# Patient Record
Sex: MALE | URBAN - NONMETROPOLITAN AREA
[De-identification: names, ages, dates, MRNs, and addresses within clinical notes are randomized per-mention and may not be internally consistent; named-entity substitution may affect disease eponyms.]

---

## 2021-12-03 ENCOUNTER — OFFICE VISIT (OUTPATIENT)
Dept: URBAN - NONMETROPOLITAN AREA CLINIC 8 | Facility: CLINIC | Age: 66
End: 2021-12-03
Payer: MEDICARE

## 2021-12-03 DIAGNOSIS — H25.13 AGE-RELATED NUCLEAR CATARACT, BILATERAL: Primary | ICD-10-CM

## 2021-12-03 DIAGNOSIS — H43.813 VITREOUS DEGENERATION, BILATERAL: ICD-10-CM

## 2021-12-03 PROCEDURE — 92004 COMPRE OPH EXAM NEW PT 1/>: CPT | Performed by: OPTOMETRIST

## 2021-12-03 ASSESSMENT — INTRAOCULAR PRESSURE
OD: 16
OS: 16

## 2021-12-03 ASSESSMENT — KERATOMETRY
OS: 44.38
OD: 44.38

## 2021-12-03 NOTE — IMPRESSION/PLAN
Impression: Age-related nuclear cataract, bilateral: H25.13. Plan: Cataracts account for the patient's complaints. No treatment currently recommended. The patient will monitor vision changes and contact us with any decrease in vision. Pt ed.

## 2022-03-07 ENCOUNTER — OFFICE VISIT (OUTPATIENT)
Dept: URBAN - NONMETROPOLITAN AREA CLINIC 8 | Facility: CLINIC | Age: 67
End: 2022-03-07
Payer: MEDICARE

## 2022-03-07 DIAGNOSIS — H04.123 DRY EYE SYNDROME OF BILATERAL LACRIMAL GLANDS: ICD-10-CM

## 2022-03-07 PROCEDURE — 99213 OFFICE O/P EST LOW 20 MIN: CPT | Performed by: OPTOMETRIST

## 2022-03-07 ASSESSMENT — INTRAOCULAR PRESSURE
OS: 16
OD: 16

## 2023-01-04 ENCOUNTER — OFFICE VISIT (OUTPATIENT)
Dept: URBAN - NONMETROPOLITAN AREA CLINIC 8 | Facility: CLINIC | Age: 68
End: 2023-01-04
Payer: MEDICARE

## 2023-01-04 DIAGNOSIS — H04.123 DRY EYE SYNDROME OF BILATERAL LACRIMAL GLANDS: ICD-10-CM

## 2023-01-04 DIAGNOSIS — H25.813 COMBINED FORMS OF AGE-RELATED CATARACT, BILATERAL: Primary | ICD-10-CM

## 2023-01-04 DIAGNOSIS — H43.813 VITREOUS DEGENERATION, BILATERAL: ICD-10-CM

## 2023-01-04 PROCEDURE — 92014 COMPRE OPH EXAM EST PT 1/>: CPT | Performed by: OPTOMETRIST

## 2023-01-04 ASSESSMENT — INTRAOCULAR PRESSURE
OS: 16
OD: 16

## 2023-01-04 ASSESSMENT — VISUAL ACUITY
OD: 20/20
OS: 20/70

## 2023-01-04 ASSESSMENT — KERATOMETRY
OD: 44.38
OS: 44.88

## 2023-01-04 NOTE — IMPRESSION/PLAN
Impression: Combined forms of age-related cataract, bilateral: H25.813. Plan: Cataracts account for the patient's complaints. Discussed all risks, benefits, procedures and recovery. Patient understands changing glasses will not improve vision. Patient desires to have surgery. Will schedule cataract surgery consult with Dr. Taqueria Lucas.

## 2023-01-09 ENCOUNTER — OFFICE VISIT (OUTPATIENT)
Dept: URBAN - METROPOLITAN AREA CLINIC 63 | Facility: CLINIC | Age: 68
End: 2023-01-09
Payer: MEDICARE

## 2023-01-09 DIAGNOSIS — H25.813 COMBINED FORMS OF AGE-RELATED CATARACT, BILATERAL: Primary | ICD-10-CM

## 2023-01-09 DIAGNOSIS — H43.813 VITREOUS DEGENERATION, BILATERAL: ICD-10-CM

## 2023-01-09 PROCEDURE — 99204 OFFICE O/P NEW MOD 45 MIN: CPT | Performed by: OPHTHALMOLOGY

## 2023-01-09 ASSESSMENT — VISUAL ACUITY
OD: 20/20
OS: 20/70

## 2023-01-09 ASSESSMENT — INTRAOCULAR PRESSURE
OS: 15
OD: 16

## 2023-01-09 NOTE — IMPRESSION/PLAN
Impression: Combined forms of age-related cataract, bilateral: H25.813. Plan: Cataract accounts for pt complaints. Pt desires sx. Schedule CE/IOL both eyes, left then right. Risk/Benefits/Alternatives discussed with patient. Rec. mono-focal/Toric/Vivity. Consider ORA/LRI. RL2. Target distance. Order a-scan. Will likely need cataract surgery in the other eye due to anisometropia. Discussed loss of all near vision with distance target. Patient to decide distance vs near target. Patient is a candidate for Delgado Denise. Discussed R/B/A. Recommend Ofloxacin or Tobramycin QID for 1 week postop. Intra-cameral Moxifloxacin to decrease the risk of infection. May elect to use combination drops or generics per patient preference.

## 2023-03-22 ENCOUNTER — PROCEDURE (OUTPATIENT)
Dept: URBAN - METROPOLITAN AREA SURGERY 37 | Facility: SURGERY | Age: 68
End: 2023-03-22
Payer: MEDICARE

## 2023-03-22 DIAGNOSIS — H25.813 COMBINED FORMS OF AGE-RELATED CATARACT, BILATERAL: ICD-10-CM

## 2023-03-22 DIAGNOSIS — H52.223 REGULAR ASTIGMATISM, BILATERAL: Primary | ICD-10-CM

## 2023-03-22 PROCEDURE — PR1CP PR1CP: CUSTOM | Performed by: OPHTHALMOLOGY

## 2023-03-22 PROCEDURE — 66984 XCAPSL CTRC RMVL W/O ECP: CPT | Performed by: OPHTHALMOLOGY

## 2023-03-23 ENCOUNTER — POST-OPERATIVE VISIT (OUTPATIENT)
Dept: URBAN - NONMETROPOLITAN AREA CLINIC 8 | Facility: CLINIC | Age: 68
End: 2023-03-23
Payer: MEDICARE

## 2023-03-23 DIAGNOSIS — Z48.810 ENCOUNTER FOR SURGICAL AFTERCARE FOLLOWING SURGERY ON A SENSE ORGAN: Primary | ICD-10-CM

## 2023-03-23 PROCEDURE — 99024 POSTOP FOLLOW-UP VISIT: CPT | Performed by: OPHTHALMOLOGY

## 2023-03-23 ASSESSMENT — INTRAOCULAR PRESSURE
OS: 35
OS: 12

## 2023-03-23 NOTE — IMPRESSION/PLAN
Impression: S/P Cataract Extraction by phacoemulsification with IOL placement; ORA; LRI (Limbal Relaxing Incision) OS - 1 Day. Encounter for surgical aftercare following surgery on a sense organ  Z48.810. Plan: Doing well. Using Ofloxacin QID OS x 1 week. Consider Eygeanroce OD.

## 2023-03-31 ENCOUNTER — POST-OPERATIVE VISIT (OUTPATIENT)
Dept: URBAN - NONMETROPOLITAN AREA CLINIC 8 | Facility: CLINIC | Age: 68
End: 2023-03-31
Payer: MEDICARE

## 2023-03-31 DIAGNOSIS — Z48.810 ENCOUNTER FOR SURGICAL AFTERCARE FOLLOWING SURGERY ON A SENSE ORGAN: Primary | ICD-10-CM

## 2023-03-31 PROCEDURE — 99024 POSTOP FOLLOW-UP VISIT: CPT | Performed by: OPTOMETRIST

## 2023-03-31 ASSESSMENT — VISUAL ACUITY
OS: 20/20
OD: 20/20

## 2023-03-31 ASSESSMENT — INTRAOCULAR PRESSURE
OS: 13
OD: 16

## 2023-03-31 NOTE — IMPRESSION/PLAN
Impression: S/P Cataract Extraction by phacoemulsification with IOL placement; ORA; LRI (Limbal Relaxing Incision) OS - 9 Days. Encounter for surgical aftercare following surgery on a sense organ  Z48.810. Plan: RTC for 2nd eye cataract Sx. --Advised patient to use artificial tears for comfort.

## 2023-04-07 ENCOUNTER — POST-OPERATIVE VISIT (OUTPATIENT)
Dept: URBAN - NONMETROPOLITAN AREA CLINIC 8 | Facility: CLINIC | Age: 68
End: 2023-04-07
Payer: MEDICARE

## 2023-04-07 DIAGNOSIS — Z96.1 PRESENCE OF INTRAOCULAR LENS: Primary | ICD-10-CM

## 2023-04-07 PROCEDURE — 99024 POSTOP FOLLOW-UP VISIT: CPT | Performed by: OPTOMETRIST

## 2023-04-07 ASSESSMENT — INTRAOCULAR PRESSURE
OD: 34
OD: 36
OD: 10

## 2023-04-07 NOTE — IMPRESSION/PLAN
Impression: S/P Cataract Extraction by phacoemulsification with IOL placement; ORA; LRI (Limbal Relaxing Incision) OD - 1 Day. Presence of intraocular lens  Z96.1.  Plan: 1 wk, PO2

## 2023-04-14 ENCOUNTER — POST-OPERATIVE VISIT (OUTPATIENT)
Dept: URBAN - NONMETROPOLITAN AREA CLINIC 8 | Facility: CLINIC | Age: 68
End: 2023-04-14
Payer: MEDICARE

## 2023-04-14 DIAGNOSIS — Z96.1 PRESENCE OF INTRAOCULAR LENS: Primary | ICD-10-CM

## 2023-04-14 PROCEDURE — 99024 POSTOP FOLLOW-UP VISIT: CPT | Performed by: OPTOMETRIST

## 2023-04-14 ASSESSMENT — INTRAOCULAR PRESSURE
OD: 10
OS: 10

## 2023-04-14 NOTE — IMPRESSION/PLAN
Impression: S/P Cataract Extraction by phacoemulsification with IOL placement; ORA; LRI (Limbal Relaxing Incision) OD - 8 Days. Presence of intraocular lens  Z96.1. Plan: RTC in 3 weeks for PO3. Continue AFT's PRN OU.

## 2023-05-05 ENCOUNTER — POST-OPERATIVE VISIT (OUTPATIENT)
Dept: URBAN - NONMETROPOLITAN AREA CLINIC 8 | Facility: CLINIC | Age: 68
End: 2023-05-05
Payer: MEDICARE

## 2023-05-05 DIAGNOSIS — Z96.1 PRESENCE OF INTRAOCULAR LENS: Primary | ICD-10-CM

## 2023-05-05 PROCEDURE — 99024 POSTOP FOLLOW-UP VISIT: CPT | Performed by: OPTOMETRIST

## 2023-05-05 ASSESSMENT — INTRAOCULAR PRESSURE
OS: 13
OD: 13

## 2023-05-05 NOTE — IMPRESSION/PLAN
Impression: S/P Cataract Extraction by phacoemulsification with IOL placement; ORA; LRI (Limbal Relaxing Incision) OD - 29 Days. Presence of intraocular lens  Z96.1. Plan: RTC in 3 months for DE. Continue AFT's OU.

## 2023-08-04 ENCOUNTER — OFFICE VISIT (OUTPATIENT)
Dept: URBAN - NONMETROPOLITAN AREA CLINIC 8 | Facility: CLINIC | Age: 68
End: 2023-08-04
Payer: MEDICARE

## 2023-08-04 DIAGNOSIS — H43.813 VITREOUS DEGENERATION, BILATERAL: Primary | ICD-10-CM

## 2023-08-04 DIAGNOSIS — H04.123 DRY EYE SYNDROME OF BILATERAL LACRIMAL GLANDS: ICD-10-CM

## 2023-08-04 DIAGNOSIS — Z96.1 PRESENCE OF INTRAOCULAR LENS: ICD-10-CM

## 2023-08-04 PROCEDURE — 92014 COMPRE OPH EXAM EST PT 1/>: CPT | Performed by: OPTOMETRIST

## 2023-08-04 ASSESSMENT — INTRAOCULAR PRESSURE
OD: 12
OS: 12

## 2024-02-22 ENCOUNTER — OFFICE VISIT (OUTPATIENT)
Dept: URBAN - NONMETROPOLITAN AREA CLINIC 8 | Facility: CLINIC | Age: 69
End: 2024-02-22
Payer: MEDICARE

## 2024-02-22 DIAGNOSIS — H00.12 CHALAZION RIGHT LOWER EYELID: Primary | ICD-10-CM

## 2024-02-22 DIAGNOSIS — H01.8 OTHER SPECIFIED INFLAMMATIONS OF EYELID: ICD-10-CM

## 2024-02-22 PROCEDURE — 99213 OFFICE O/P EST LOW 20 MIN: CPT | Performed by: OPHTHALMOLOGY

## 2024-02-22 RX ORDER — BACITRACIN ZINC AND POLYMYXIN B SULFATE 500; 10000 [USP'U]/G; [USP'U]/G
OINTMENT OPHTHALMIC
Qty: 3.5 | Refills: 3 | Status: ACTIVE
Start: 2024-02-22

## 2024-02-22 RX ORDER — CEPHALEXIN 500 MG/1
500 MG CAPSULE ORAL
Qty: 20 | Refills: 0 | Status: ACTIVE
Start: 2024-02-22

## 2024-02-22 ASSESSMENT — INTRAOCULAR PRESSURE
OD: 12
OS: 11

## 2024-11-22 ENCOUNTER — OFFICE VISIT (OUTPATIENT)
Dept: URBAN - NONMETROPOLITAN AREA CLINIC 8 | Facility: CLINIC | Age: 69
End: 2024-11-22
Payer: MEDICARE

## 2024-11-22 DIAGNOSIS — H43.813 VITREOUS DEGENERATION, BILATERAL: Primary | ICD-10-CM

## 2024-11-22 DIAGNOSIS — H04.123 DRY EYE SYNDROME OF BILATERAL LACRIMAL GLANDS: ICD-10-CM

## 2024-11-22 DIAGNOSIS — H26.493 OTHER SECONDARY CATARACT, BILATERAL: ICD-10-CM

## 2024-11-22 PROCEDURE — 92014 COMPRE OPH EXAM EST PT 1/>: CPT | Performed by: OPTOMETRIST

## 2024-11-22 ASSESSMENT — KERATOMETRY
OD: 44.13
OS: 44.13

## 2024-11-22 ASSESSMENT — INTRAOCULAR PRESSURE
OD: 14
OS: 14